# Patient Record
Sex: FEMALE | Race: ASIAN | Employment: FULL TIME | URBAN - METROPOLITAN AREA
[De-identification: names, ages, dates, MRNs, and addresses within clinical notes are randomized per-mention and may not be internally consistent; named-entity substitution may affect disease eponyms.]

---

## 2023-12-12 LAB — PAP SMEAR, EXTERNAL: NORMAL

## 2024-07-24 ENCOUNTER — OFFICE VISIT (OUTPATIENT)
Dept: FAMILY MEDICINE CLINIC | Age: 34
End: 2024-07-24
Payer: COMMERCIAL

## 2024-07-24 VITALS
BODY MASS INDEX: 19.41 KG/M2 | SYSTOLIC BLOOD PRESSURE: 104 MMHG | DIASTOLIC BLOOD PRESSURE: 68 MMHG | OXYGEN SATURATION: 99 % | HEART RATE: 77 BPM | HEIGHT: 57 IN | WEIGHT: 90 LBS | RESPIRATION RATE: 16 BRPM

## 2024-07-24 DIAGNOSIS — R11.0 NAUSEA: ICD-10-CM

## 2024-07-24 DIAGNOSIS — R12 HEARTBURN: ICD-10-CM

## 2024-07-24 DIAGNOSIS — R10.11 COLICKY RUQ ABDOMINAL PAIN: ICD-10-CM

## 2024-07-24 DIAGNOSIS — R30.0 DYSURIA: ICD-10-CM

## 2024-07-24 DIAGNOSIS — Z00.00 ANNUAL PHYSICAL EXAM: Primary | ICD-10-CM

## 2024-07-24 LAB
ALBUMIN SERPL-MCNC: 4.8 G/DL (ref 3.4–5)
ALBUMIN/GLOB SERPL: 1.6 {RATIO} (ref 1.1–2.2)
ALP SERPL-CCNC: 79 U/L (ref 40–129)
ALT SERPL-CCNC: 17 U/L (ref 10–40)
ANION GAP SERPL CALCULATED.3IONS-SCNC: 11 MMOL/L (ref 3–16)
APPEARANCE FLUID: NORMAL
AST SERPL-CCNC: 36 U/L (ref 15–37)
BASOPHILS # BLD: 0 K/UL (ref 0–0.2)
BASOPHILS NFR BLD: 0.9 %
BILIRUB SERPL-MCNC: 0.5 MG/DL (ref 0–1)
BILIRUBIN, POC: NEGATIVE
BLOOD URINE, POC: NORMAL
BUN SERPL-MCNC: 11 MG/DL (ref 7–20)
CALCIUM SERPL-MCNC: 9.6 MG/DL (ref 8.3–10.6)
CHLORIDE SERPL-SCNC: 102 MMOL/L (ref 99–110)
CHOLEST SERPL-MCNC: 179 MG/DL (ref 0–199)
CLARITY, POC: NORMAL
CO2 SERPL-SCNC: 26 MMOL/L (ref 21–32)
COLOR, POC: NORMAL
CREAT SERPL-MCNC: 0.5 MG/DL (ref 0.6–1.1)
DEPRECATED RDW RBC AUTO: 13.7 % (ref 12.4–15.4)
EOSINOPHIL # BLD: 0.3 K/UL (ref 0–0.6)
EOSINOPHIL NFR BLD: 4.6 %
GFR SERPLBLD CREATININE-BSD FMLA CKD-EPI: >90 ML/MIN/{1.73_M2}
GLUCOSE SERPL-MCNC: 93 MG/DL (ref 70–99)
GLUCOSE URINE, POC: NEGATIVE
HCT VFR BLD AUTO: 41.1 % (ref 36–48)
HDLC SERPL-MCNC: 88 MG/DL (ref 40–60)
HGB BLD-MCNC: 14.1 G/DL (ref 12–16)
KETONES, POC: NEGATIVE
LDLC SERPL CALC-MCNC: 77 MG/DL
LEUKOCYTE EST, POC: NEGATIVE
LYMPHOCYTES # BLD: 1.5 K/UL (ref 1–5.1)
LYMPHOCYTES NFR BLD: 27.5 %
MCH RBC QN AUTO: 31.3 PG (ref 26–34)
MCHC RBC AUTO-ENTMCNC: 34.4 G/DL (ref 31–36)
MCV RBC AUTO: 90.8 FL (ref 80–100)
MONOCYTES # BLD: 0.4 K/UL (ref 0–1.3)
MONOCYTES NFR BLD: 6.9 %
NEUTROPHILS # BLD: 3.3 K/UL (ref 1.7–7.7)
NEUTROPHILS NFR BLD: 60.1 %
NITRITE, POC: NEGATIVE
PH, POC: 5.5
PLATELET # BLD AUTO: 254 K/UL (ref 135–450)
PMV BLD AUTO: 8.1 FL (ref 5–10.5)
POTASSIUM SERPL-SCNC: 4.5 MMOL/L (ref 3.5–5.1)
PROT SERPL-MCNC: 7.8 G/DL (ref 6.4–8.2)
PROTEIN, POC: NEGATIVE
RBC # BLD AUTO: 4.52 M/UL (ref 4–5.2)
SODIUM SERPL-SCNC: 139 MMOL/L (ref 136–145)
SPECIFIC GRAVITY, POC: 1.02
TRIGL SERPL-MCNC: 72 MG/DL (ref 0–150)
TSH SERPL DL<=0.005 MIU/L-ACNC: 3.91 UIU/ML (ref 0.27–4.2)
UROBILINOGEN, POC: NORMAL
VLDLC SERPL CALC-MCNC: 14 MG/DL
WBC # BLD AUTO: 5.6 K/UL (ref 4–11)

## 2024-07-24 PROCEDURE — 36415 COLL VENOUS BLD VENIPUNCTURE: CPT | Performed by: PHYSICIAN ASSISTANT

## 2024-07-24 PROCEDURE — G8420 CALC BMI NORM PARAMETERS: HCPCS | Performed by: PHYSICIAN ASSISTANT

## 2024-07-24 PROCEDURE — G8427 DOCREV CUR MEDS BY ELIG CLIN: HCPCS | Performed by: PHYSICIAN ASSISTANT

## 2024-07-24 PROCEDURE — 99385 PREV VISIT NEW AGE 18-39: CPT | Performed by: PHYSICIAN ASSISTANT

## 2024-07-24 PROCEDURE — 81002 URINALYSIS NONAUTO W/O SCOPE: CPT | Performed by: PHYSICIAN ASSISTANT

## 2024-07-24 PROCEDURE — 4004F PT TOBACCO SCREEN RCVD TLK: CPT | Performed by: PHYSICIAN ASSISTANT

## 2024-07-24 PROCEDURE — 99204 OFFICE O/P NEW MOD 45 MIN: CPT | Performed by: PHYSICIAN ASSISTANT

## 2024-07-24 RX ORDER — ASCORBIC ACID 500 MG
500 TABLET ORAL DAILY PRN
COMMUNITY

## 2024-07-24 NOTE — PATIENT INSTRUCTIONS
Healthy Living Recommendations 2024:  Exercise 150 minutes/ week: Aerobic and Weights Aerobic exercise strengthens muscle while weights and resistance strengthens bone. Body Mass Index (BMI) goal is 25.     Nutrition: Avoid salting foods. Limit caffeine to less than 3 cups caffeine/day. Limit carbohydrates like breads, rice, and pastas. Avoid processed and fried foods. Eat baked or broiled foods. One can never have too many vegetables and fruits which are good fiber source. Fiber lowers glucose levels. Studies show diets high in red meat and processed foods WILL increase the risk heart and liver diseases, cancers, and dementia.   Sugar : Female: Maximum sugar/ day is 6 tsp or 24 grams/ day               Male: Maximum sugar/ day is 9 tsp or 28 grams/ day  Protein:  used to protect immune system, regulate hormones,and build muscle.   High protein foods: [place], Greek yogurt, chicken, Lentils, Eggs  Calcium/ Vit D3 intake helps bones, digestion, kidneys, and mental health.   High calcium foods:  milk, yogurt, cheese, beans, dark green leafy vegetables.   High Vitamin D foods:  salmon, tuna fish, fortified cereals, mushrooms.  Tobacco: Avoid all smoking     Eye exam every 2 years after age 40.   Dental; Brush twice a day. Floss daily. Dentist exam every 6 months.  Noise: recurrent loud noise WILL result in hearing loss.   Earbud use: keep volume below 50%. Just 5 minutes at 100% volume will result in permanent hearing loss.   Colonoscopy Begin at age 45.    Skin: examine skin monthly for changes.  Breasts: Perform monthly self-breast exam for changes. FM: Yearly mammograms begin age 40. Repeat yearly.  Males   perform monthly self-testicular exam for changes.  Urinary changes can be a sign of prostate issues.         [facility]   [address], [address], [place] [address]  Office hours: Monday - Friday 7 am- 5 pm. Phone lines turn on at 8 am.   Office PH: [phone], FAX [phone]

## 2024-07-24 NOTE — PROGRESS NOTES
[facility]   ANNUAL MEDICAL EXAMINATION       7/24/2024       CC:    Chief Complaint   Patient presents with    New Patient     Pt wanted to establish care with JOEL Hicks.  Pt has on and off right sided stomach pain for past x2 months usually after eating. Pt is fasting for blood work.     Establish Care       HPI: Ange Hurtado 1990 is a 33 y.o. NEW to provider who presents for a complete health examination and    C/o RUQ and epigastric pain x 1 month. Eating worsens it. Laxative did not help. Meat and greasy foods make it worse. Associated with nausea and pain to right shoulder. Pt had UTIS when in [facility] and has dysuria now.   Not affected by tomato and acidic  foods.Drinks wine nightly to help sleep.  Denies melena or hematochezia. PGM and many uncles had cholecystitis.    PREVENTIVE HEALTH:   Last eye exam:    12/2023  Hearing concerns: No  Last Dental exam:     not regularly  Caffeine use:  1-3/ day  Exercise:  nothing, work from home.   Diet: feels needs improvement on  more healthy foods  Perform routine skin checks? No  Perform monthly self breast exams?  No  Last Mammo:   patient has never had a mammogram  Last gyn exam:   < 1 year  [address] [place]  Colonoscopy:  No prior colonoscopy  Advanced directives: no     REVIEW OF SYSTEMS:    Pertinent positive and negatives are in HPI. The remaining reviewed and are unremarkable for other constitutional, EENT, cardiac, pulmonary, GI, , neurologic, musculoskeletal, or integumentary complaints    PAST MEDICAL/SURGICAL/SOCIAL HISTORY:  Reviewed and updated    ALLERGIES:    Patient has no known allergies.    MEDICATIONS:  Current Outpatient Medications on File Prior to Visit   Medication Sig Dispense Refill    vitamin C (ASCORBIC ACID) 500 MG tablet Take 1 tablet by mouth daily as needed Gummies       No current facility-administered medications on file prior to visit.          PHYSICAL EXAM:   Vitals:    07/24/24 1003   BP: 104/68

## 2024-07-25 NOTE — RESULT ENCOUNTER NOTE
Patient was advised of results and voiced understanding.  Her ultrasound is scheduled for this Monday at 10am.

## 2024-07-29 ENCOUNTER — HOSPITAL ENCOUNTER (OUTPATIENT)
Dept: ULTRASOUND IMAGING | Age: 34
Discharge: HOME | End: 2024-07-29
Payer: COMMERCIAL

## 2024-07-29 DIAGNOSIS — R12 HEARTBURN: ICD-10-CM

## 2024-07-29 DIAGNOSIS — R11.0 NAUSEA: ICD-10-CM

## 2024-07-29 DIAGNOSIS — R10.11 COLICKY RUQ ABDOMINAL PAIN: ICD-10-CM

## 2024-07-29 DIAGNOSIS — Z00.00 ANNUAL PHYSICAL EXAM: ICD-10-CM

## 2024-07-29 PROCEDURE — 76705 ECHO EXAM OF ABDOMEN: CPT

## 2024-08-07 ENCOUNTER — OFFICE VISIT (OUTPATIENT)
Dept: FAMILY MEDICINE CLINIC | Age: 34
End: 2024-08-07

## 2024-08-07 VITALS
HEART RATE: 73 BPM | HEIGHT: 57 IN | WEIGHT: 90.2 LBS | RESPIRATION RATE: 14 BRPM | SYSTOLIC BLOOD PRESSURE: 98 MMHG | TEMPERATURE: 97.8 F | BODY MASS INDEX: 19.46 KG/M2 | OXYGEN SATURATION: 99 % | DIASTOLIC BLOOD PRESSURE: 70 MMHG

## 2024-08-07 DIAGNOSIS — R10.11 COLICKY RUQ ABDOMINAL PAIN: Primary | ICD-10-CM

## 2024-08-07 DIAGNOSIS — R11.0 NAUSEA: ICD-10-CM

## 2024-08-07 DIAGNOSIS — R12 HEARTBURN: ICD-10-CM

## 2024-08-07 ASSESSMENT — PATIENT HEALTH QUESTIONNAIRE - PHQ9
6. FEELING BAD ABOUT YOURSELF - OR THAT YOU ARE A FAILURE OR HAVE LET YOURSELF OR YOUR FAMILY DOWN: NOT AT ALL
SUM OF ALL RESPONSES TO PHQ9 QUESTIONS 1 & 2: 0
8. MOVING OR SPEAKING SO SLOWLY THAT OTHER PEOPLE COULD HAVE NOTICED. OR THE OPPOSITE, BEING SO FIGETY OR RESTLESS THAT YOU HAVE BEEN MOVING AROUND A LOT MORE THAN USUAL: NOT AT ALL
2. FEELING DOWN, DEPRESSED OR HOPELESS: NOT AT ALL
5. POOR APPETITE OR OVEREATING: NOT AT ALL
1. LITTLE INTEREST OR PLEASURE IN DOING THINGS: NOT AT ALL
4. FEELING TIRED OR HAVING LITTLE ENERGY: NOT AT ALL
7. TROUBLE CONCENTRATING ON THINGS, SUCH AS READING THE NEWSPAPER OR WATCHING TELEVISION: NOT AT ALL
9. THOUGHTS THAT YOU WOULD BE BETTER OFF DEAD, OR OF HURTING YOURSELF: NOT AT ALL
3. TROUBLE FALLING OR STAYING ASLEEP: NOT AT ALL
10. IF YOU CHECKED OFF ANY PROBLEMS, HOW DIFFICULT HAVE THESE PROBLEMS MADE IT FOR YOU TO DO YOUR WORK, TAKE CARE OF THINGS AT HOME, OR GET ALONG WITH OTHER PEOPLE: NOT DIFFICULT AT ALL
SUM OF ALL RESPONSES TO PHQ QUESTIONS 1-9: 0

## 2024-08-07 NOTE — PROGRESS NOTES
[facility] MEDICINE  08/07/24       IMPRESSION/ PLAN    Colicky RUQ abdominal pain  Nausea with Heartburn  Pt improved with dietary changes.   -Use Tums or Rolaids or Pepcid as needed for exacerbations.  -All results were discussed with patient    Follow Up yearly or sooner if pain returns      CHIEF COMPLAINT  Chief Complaint   Patient presents with    2 Week Follow-Up     Pt is here for a 2 week follow up and to discuss the results of her recent US    Results     HISTORY OF PRESENT  ILLNESS  Ange Hurtado is a 33 y.o.  female   better.  Admits she discontinued eating the fat around meat which she likes and symptoms resolved.  No new complaints.    ROS:  Remaining reviewed and are unremarkable for other constitutional, EENT, cardiac, pulmonary, GI, , neurologic, musculoskeletal, or integumentary complaints.      PAST MEDICAL/SURGICAL, SOCIAL, &  FAMILY HISTORY:  Reviewed and updated accordingly.     ALLERGIES : Patient has no known allergies.      MEDICATIONS:  Current Outpatient Medications on File Prior to Visit   Medication Sig Dispense Refill    vitamin C (ASCORBIC ACID) 500 MG tablet Take 1 tablet by mouth daily as needed Gummies       No current facility-administered medications on file prior to visit.        PHYSICAL EXAM     Vitals:    08/07/24 0954   BP: 98/70   Pulse: 73   Resp: 14   Temp: 97.8 °F (36.6 °C)   TempSrc: Temporal   SpO2: 99%   Weight: 40.9 kg (90 lb 3.2 oz)   Height: 1.448 m (4' 9\")   Body mass index is 19.52 kg/m².  APPEARANCE: Well nourished. No distress.   HEENT: Head: Normocephalic, atraumatic.  EOMI,PERRLA. Conjunctiva pink and moist. Sclera white. Hearing intact. Nares patent. Oral mucosa moist. Throat clear.  NECK: No lymphadenopathy or masses. Thyroid is smooth. Moves neck fully.  HEART: Reg rate and rhythm. No murmurs, rubs, or gallops.   LUNGS: Clear to auscultation. No wheezes, rales, or rhonchi.  ABDOMEN:  Soft, bowel sounds present, non-tender, no